# Patient Record
Sex: MALE | Race: WHITE | NOT HISPANIC OR LATINO | URBAN - METROPOLITAN AREA
[De-identification: names, ages, dates, MRNs, and addresses within clinical notes are randomized per-mention and may not be internally consistent; named-entity substitution may affect disease eponyms.]

---

## 2017-03-02 ENCOUNTER — OUTPATIENT (OUTPATIENT)
Dept: OUTPATIENT SERVICES | Facility: HOSPITAL | Age: 67
LOS: 1 days | End: 2017-03-02
Payer: MEDICARE

## 2017-03-02 DIAGNOSIS — K08.9 DISORDER OF TEETH AND SUPPORTING STRUCTURES, UNSPECIFIED: ICD-10-CM

## 2017-03-02 PROCEDURE — D2160: CPT

## 2017-03-02 PROCEDURE — D1110: CPT

## 2017-03-02 PROCEDURE — D0210: CPT

## 2017-03-02 PROCEDURE — D0120: CPT

## 2017-03-03 DIAGNOSIS — Z01.20 ENCOUNTER FOR DENTAL EXAMINATION AND CLEANING WITHOUT ABNORMAL FINDINGS: ICD-10-CM

## 2017-03-03 DIAGNOSIS — K02.9 DENTAL CARIES, UNSPECIFIED: ICD-10-CM

## 2017-05-11 ENCOUNTER — TRANSCRIPTION ENCOUNTER (OUTPATIENT)
Age: 67
End: 2017-05-11

## 2017-09-09 ENCOUNTER — TRANSCRIPTION ENCOUNTER (OUTPATIENT)
Age: 67
End: 2017-09-09

## 2017-09-24 ENCOUNTER — TRANSCRIPTION ENCOUNTER (OUTPATIENT)
Age: 67
End: 2017-09-24

## 2017-09-24 ENCOUNTER — EMERGENCY (EMERGENCY)
Facility: HOSPITAL | Age: 67
LOS: 1 days | Discharge: HOME | End: 2017-09-24
Attending: EMERGENCY MEDICINE | Admitting: EMERGENCY MEDICINE
Payer: MEDICARE

## 2017-09-24 VITALS
RESPIRATION RATE: 16 BRPM | DIASTOLIC BLOOD PRESSURE: 85 MMHG | TEMPERATURE: 97 F | HEART RATE: 79 BPM | SYSTOLIC BLOOD PRESSURE: 157 MMHG | OXYGEN SATURATION: 97 %

## 2017-09-24 VITALS
DIASTOLIC BLOOD PRESSURE: 81 MMHG | OXYGEN SATURATION: 96 % | HEART RATE: 61 BPM | RESPIRATION RATE: 18 BRPM | SYSTOLIC BLOOD PRESSURE: 131 MMHG

## 2017-09-24 LAB
APPEARANCE UR: CLEAR — SIGNIFICANT CHANGE UP
BILIRUB UR-MCNC: NEGATIVE — SIGNIFICANT CHANGE UP
COLOR SPEC: YELLOW — SIGNIFICANT CHANGE UP
DIFF PNL FLD: ABNORMAL
GLUCOSE UR QL: NEGATIVE — SIGNIFICANT CHANGE UP
KETONES UR-MCNC: NEGATIVE — SIGNIFICANT CHANGE UP
LEUKOCYTE ESTERASE UR-ACNC: ABNORMAL
NITRITE UR-MCNC: NEGATIVE — SIGNIFICANT CHANGE UP
PH UR: 6 — SIGNIFICANT CHANGE UP (ref 5–8)
PROT UR-MCNC: NEGATIVE — SIGNIFICANT CHANGE UP
RBC CASTS # UR COMP ASSIST: SIGNIFICANT CHANGE UP /HPF (ref 0–2)
SP GR SPEC: 1.01 — SIGNIFICANT CHANGE UP (ref 1.01–1.02)
UROBILINOGEN FLD QL: NEGATIVE — SIGNIFICANT CHANGE UP
WBC UR QL: SIGNIFICANT CHANGE UP /HPF (ref 0–5)

## 2017-09-24 PROCEDURE — 99284 EMERGENCY DEPT VISIT MOD MDM: CPT

## 2017-09-24 PROCEDURE — 70450 CT HEAD/BRAIN W/O DYE: CPT

## 2017-09-24 PROCEDURE — 70450 CT HEAD/BRAIN W/O DYE: CPT | Mod: 26

## 2017-09-24 PROCEDURE — 99284 EMERGENCY DEPT VISIT MOD MDM: CPT | Mod: 25

## 2017-09-24 PROCEDURE — 87086 URINE CULTURE/COLONY COUNT: CPT

## 2017-09-24 PROCEDURE — 81001 URINALYSIS AUTO W/SCOPE: CPT

## 2017-09-24 RX ORDER — CLONAZEPAM 1 MG
0 TABLET ORAL
Qty: 0 | Refills: 0 | COMMUNITY

## 2017-09-24 RX ORDER — HYDROCHLOROTHIAZIDE 25 MG
0 TABLET ORAL
Qty: 0 | Refills: 0 | COMMUNITY

## 2017-09-24 RX ORDER — LANOLIN ALCOHOL/MO/W.PET/CERES
0 CREAM (GRAM) TOPICAL
Qty: 0 | Refills: 0 | COMMUNITY

## 2017-09-24 RX ORDER — IBUPROFEN 200 MG
600 TABLET ORAL ONCE
Qty: 0 | Refills: 0 | Status: COMPLETED | OUTPATIENT
Start: 2017-09-24 | End: 2017-09-24

## 2017-09-24 RX ORDER — DEXLANSOPRAZOLE 30 MG/1
0 CAPSULE, DELAYED RELEASE ORAL
Qty: 0 | Refills: 0 | COMMUNITY

## 2017-09-24 RX ORDER — ASPIRIN/CALCIUM CARB/MAGNESIUM 324 MG
0 TABLET ORAL
Qty: 0 | Refills: 0 | COMMUNITY

## 2017-09-24 RX ORDER — TAMSULOSIN HYDROCHLORIDE 0.4 MG/1
1 CAPSULE ORAL
Qty: 0 | Refills: 0 | COMMUNITY

## 2017-09-24 RX ORDER — OXYBUTYNIN CHLORIDE 5 MG
0 TABLET ORAL
Qty: 0 | Refills: 0 | COMMUNITY

## 2017-09-24 RX ORDER — SIMVASTATIN 20 MG/1
0 TABLET, FILM COATED ORAL
Qty: 0 | Refills: 0 | COMMUNITY

## 2017-09-24 RX ADMIN — Medication 600 MILLIGRAM(S): at 15:14

## 2017-09-24 NOTE — ED ADULT NURSE NOTE - OBJECTIVE STATEMENT
65 yo m pmh of .. came to ed c/o headache since last night.  As per caregiver, pt had headache since last night, had gone to urgent care and was given promethazine and Excedrin migraine for headache, but couldn't  the prescriptions.  according to the other staff, he wasn't acting himself, current caregiver believes he has normal behavior.  Denies back pain, SOB, dizziness/lightheadedness, changes in bowel and urination habits, fevers/chills, n/v/d.  Pt at baseline mental status.  neurologically intact, PERRLA, equal strength and sensation bilaterally. 65 yo m pmh of .. came to ed c/o headache since last night.  As per caregiver, pt had headache since last night, had gone to urgent care and was given promethazine and Excedrin migraine for headache, but couldn't  the prescriptions.  according to the other staff, he wasn't acting himself, current caregiver believes he has normal behavior. Caregiver states that he has had changes in walking, needing to grab a railing for support.  Pt states that he felt dizziness with headache that resolved on its own. Denies back pain, SOB, dizziness/lightheadedness, changes in bowel and urination habits, fevers/chills, n/v/d.  Pt at baseline mental status.  neurologically intact, PERRLA, equal strength and sensation bilaterally.  safety maintained. Caregiver, Stephen, present at bedside.

## 2017-09-24 NOTE — ED PROVIDER NOTE - MEDICAL DECISION MAKING DETAILS
Headache most consistent with primary HA 1) Pain control - Motrin 2) Reassess Headache most consistent with primary HA 1) Pain control - Motrin 2) Reassess  ATTD: headache, concern for edema / hydroceph. will check ct head, check urine, pain medication, re eval for dispo

## 2017-09-24 NOTE — ED PROVIDER NOTE - PROGRESS NOTE DETAILS
ATTD: CT head with no acute path. U/A with few wbc. given no urinary symp will await culture results to consider treatment. dc home. to follow with pmd and neuro as outpt. pain resolved 0/10.

## 2017-09-24 NOTE — ED PROVIDER NOTE - OBJECTIVE STATEMENT
65yo M pmhx of migraines, on ASA81 pw ha. Aid at bedside states that pt has hx of migraines which resolve with Tylenol. Pt states this is his usual ha except that it did not resolve. Pt went to urgent care and was prescribed Excedrin migraine but could not  prescriptions so he came to ED for medication that he can take home with him. AId states pt was in pain this am but now appears at baseline. HA described by pt as frontal pressure, does not move, no photophobia, no neck pain.  No fever/chills, no change in vision, no throat pain, no chest pain, no sob no abdominal pain, no nausea/vomiting,  no dysuria, no joint pain, no rashes, no focal numbness or weakness, no latex allergy,

## 2017-09-24 NOTE — ED PROVIDER NOTE - ATTENDING CONTRIBUTION TO CARE
65 y/o pmhx of migraines, on ASA 81 presents for headache frontal that began earlier today. different than usual as this time accompanied by dizziness which resolved. no radaiton. no change in speech. no incontinence of bowel or bladder.   Aid with him at bedside. states he is at baseline and has been working with him for 4 months. only diference is dizziness not typical. no fever. no chills. no urinary complaints.  prior to coming to ER was seen at Urgent Care and sent home with rx for excedrine migraine and promethazine. no releif with APAP as usual at home today.   Gen. no acute distress, Non toxic   HEENT: EOMI, mmm,   Lungs: CTAB/L no C/ W /R   CVS: S1S2   Abd; Soft non tender, non distended   Ext: no edema   Neuro awake, alert, oriented to person, place, and season, not date, month /year which is at baseline. clear speech 65 y/o pmhx of migraines, on ASA 81 presents for headache frontal that began earlier today. different than usual as this time accompanied by dizziness which resolved. no radaiton. no change in speech. no incontinence of bowel or bladder.   Aid with him at bedside. states he is at baseline and has been working with him for 4 months. only difference is dizziness not typical. no fever. no chills. no urinary complaints.  prior to coming to ER was seen at Urgent Care and sent home with rx for excedrine migraine and promethazine. no releif with APAP as usual at home today.   Gen. no acute distress, Non toxic   HEENT: EOMI, mmm,   Lungs: CTAB/L no C/ W /R   CVS: S1S2   Abd; Soft non tender, non distended   Ext: no edema   Neuro awake, alert, oriented to person, place, and season, not date, month /year which is at baseline. clear speech

## 2017-09-24 NOTE — ED ADULT NURSE NOTE - CHPI ED SYMPTOMS NEG
no nausea/no numbness/no weakness/no confusion/no blurred vision/no change in level of consciousness/no dizziness/no fever/no vomiting/no loss of consciousness

## 2017-09-25 LAB
CULTURE RESULTS: SIGNIFICANT CHANGE UP
SPECIMEN SOURCE: SIGNIFICANT CHANGE UP

## 2017-11-09 ENCOUNTER — OUTPATIENT (OUTPATIENT)
Dept: OUTPATIENT SERVICES | Facility: HOSPITAL | Age: 67
LOS: 1 days | End: 2017-11-09
Payer: MEDICARE

## 2017-11-09 DIAGNOSIS — Z01.20 ENCOUNTER FOR DENTAL EXAMINATION AND CLEANING WITHOUT ABNORMAL FINDINGS: ICD-10-CM

## 2017-11-09 DIAGNOSIS — K08.9 DISORDER OF TEETH AND SUPPORTING STRUCTURES, UNSPECIFIED: ICD-10-CM

## 2017-11-09 PROCEDURE — D1110: CPT

## 2017-11-09 PROCEDURE — D0120: CPT

## 2017-11-09 PROCEDURE — D0270: CPT

## 2017-11-09 PROCEDURE — D0220: CPT

## 2017-11-29 ENCOUNTER — OUTPATIENT (OUTPATIENT)
Dept: OUTPATIENT SERVICES | Facility: HOSPITAL | Age: 67
LOS: 1 days | End: 2017-11-29
Payer: MEDICARE

## 2017-11-29 DIAGNOSIS — K08.9 DISORDER OF TEETH AND SUPPORTING STRUCTURES, UNSPECIFIED: ICD-10-CM

## 2017-11-29 PROCEDURE — D7210: CPT

## 2017-11-30 DIAGNOSIS — K02.9 DENTAL CARIES, UNSPECIFIED: ICD-10-CM

## 2017-12-07 ENCOUNTER — OUTPATIENT (OUTPATIENT)
Dept: OUTPATIENT SERVICES | Facility: HOSPITAL | Age: 67
LOS: 1 days | End: 2017-12-07

## 2017-12-07 DIAGNOSIS — K08.9 DISORDER OF TEETH AND SUPPORTING STRUCTURES, UNSPECIFIED: ICD-10-CM

## 2018-04-23 ENCOUNTER — TRANSCRIPTION ENCOUNTER (OUTPATIENT)
Age: 68
End: 2018-04-23

## 2018-07-31 ENCOUNTER — TRANSCRIPTION ENCOUNTER (OUTPATIENT)
Age: 68
End: 2018-07-31

## 2018-08-18 ENCOUNTER — TRANSCRIPTION ENCOUNTER (OUTPATIENT)
Age: 68
End: 2018-08-18

## 2018-10-04 ENCOUNTER — OUTPATIENT (OUTPATIENT)
Dept: OUTPATIENT SERVICES | Facility: HOSPITAL | Age: 68
LOS: 1 days | End: 2018-10-04
Payer: MEDICARE

## 2018-10-04 DIAGNOSIS — Z01.20 ENCOUNTER FOR DENTAL EXAMINATION AND CLEANING WITHOUT ABNORMAL FINDINGS: ICD-10-CM

## 2018-10-04 DIAGNOSIS — K08.9 DISORDER OF TEETH AND SUPPORTING STRUCTURES, UNSPECIFIED: ICD-10-CM

## 2018-10-04 PROCEDURE — D0120: CPT

## 2018-10-04 PROCEDURE — D1110: CPT

## 2018-10-16 ENCOUNTER — TRANSCRIPTION ENCOUNTER (OUTPATIENT)
Age: 68
End: 2018-10-16

## 2018-10-18 ENCOUNTER — TRANSCRIPTION ENCOUNTER (OUTPATIENT)
Age: 68
End: 2018-10-18

## 2019-02-05 ENCOUNTER — TRANSCRIPTION ENCOUNTER (OUTPATIENT)
Age: 69
End: 2019-02-05

## 2019-08-01 ENCOUNTER — OUTPATIENT (OUTPATIENT)
Dept: OUTPATIENT SERVICES | Facility: HOSPITAL | Age: 69
LOS: 1 days | End: 2019-08-01
Payer: MEDICARE

## 2019-08-01 DIAGNOSIS — K08.9 DISORDER OF TEETH AND SUPPORTING STRUCTURES, UNSPECIFIED: ICD-10-CM

## 2019-08-01 PROCEDURE — D0274: CPT

## 2019-08-01 PROCEDURE — D0120: CPT

## 2019-08-01 PROCEDURE — D1110: CPT

## 2019-08-08 DIAGNOSIS — Z01.20 ENCOUNTER FOR DENTAL EXAMINATION AND CLEANING WITHOUT ABNORMAL FINDINGS: ICD-10-CM

## 2020-02-05 ENCOUNTER — TRANSCRIPTION ENCOUNTER (OUTPATIENT)
Age: 70
End: 2020-02-05

## 2020-02-13 ENCOUNTER — OUTPATIENT (OUTPATIENT)
Dept: OUTPATIENT SERVICES | Facility: HOSPITAL | Age: 70
LOS: 1 days | End: 2020-02-13
Payer: MEDICARE

## 2020-02-13 PROCEDURE — D0120: CPT

## 2020-02-13 PROCEDURE — D1110: CPT

## 2020-10-01 ENCOUNTER — TRANSCRIPTION ENCOUNTER (OUTPATIENT)
Age: 70
End: 2020-10-01

## 2020-10-29 ENCOUNTER — OUTPATIENT (OUTPATIENT)
Dept: OUTPATIENT SERVICES | Facility: HOSPITAL | Age: 70
LOS: 1 days | End: 2020-10-29
Payer: MEDICARE

## 2020-10-29 DIAGNOSIS — K08.9 DISORDER OF TEETH AND SUPPORTING STRUCTURES, UNSPECIFIED: ICD-10-CM

## 2020-10-29 PROCEDURE — D9110: CPT

## 2020-10-29 PROCEDURE — D0220: CPT

## 2020-11-04 ENCOUNTER — OUTPATIENT (OUTPATIENT)
Dept: OUTPATIENT SERVICES | Facility: HOSPITAL | Age: 70
LOS: 1 days | End: 2020-11-04
Payer: MEDICARE

## 2020-11-04 DIAGNOSIS — K08.9 DISORDER OF TEETH AND SUPPORTING STRUCTURES, UNSPECIFIED: ICD-10-CM

## 2020-11-04 DIAGNOSIS — Z01.20 ENCOUNTER FOR DENTAL EXAMINATION AND CLEANING WITHOUT ABNORMAL FINDINGS: ICD-10-CM

## 2020-11-04 PROCEDURE — D7210: CPT

## 2020-11-11 DIAGNOSIS — K02.9 DENTAL CARIES, UNSPECIFIED: ICD-10-CM

## 2020-12-10 ENCOUNTER — OUTPATIENT (OUTPATIENT)
Dept: OUTPATIENT SERVICES | Facility: HOSPITAL | Age: 70
LOS: 1 days | End: 2020-12-10
Payer: MEDICARE

## 2020-12-10 DIAGNOSIS — K08.9 DISORDER OF TEETH AND SUPPORTING STRUCTURES, UNSPECIFIED: ICD-10-CM

## 2020-12-10 PROCEDURE — D0274: CPT

## 2020-12-10 PROCEDURE — D0120: CPT

## 2020-12-10 PROCEDURE — D0230: CPT

## 2020-12-10 PROCEDURE — D1110: CPT

## 2020-12-10 PROCEDURE — D0220: CPT

## 2020-12-11 ENCOUNTER — TRANSCRIPTION ENCOUNTER (OUTPATIENT)
Age: 70
End: 2020-12-11

## 2020-12-14 DIAGNOSIS — Z01.20 ENCOUNTER FOR DENTAL EXAMINATION AND CLEANING WITHOUT ABNORMAL FINDINGS: ICD-10-CM

## 2020-12-15 ENCOUNTER — OUTPATIENT (OUTPATIENT)
Dept: OUTPATIENT SERVICES | Facility: HOSPITAL | Age: 70
LOS: 1 days | Discharge: HOME | End: 2020-12-15

## 2020-12-15 ENCOUNTER — APPOINTMENT (OUTPATIENT)
Dept: SPEECH THERAPY | Facility: CLINIC | Age: 70
End: 2020-12-15

## 2020-12-16 ENCOUNTER — APPOINTMENT (OUTPATIENT)
Dept: ORTHOPEDIC SURGERY | Facility: CLINIC | Age: 70
End: 2020-12-16
Payer: MEDICARE

## 2020-12-16 VITALS
WEIGHT: 142 LBS | DIASTOLIC BLOOD PRESSURE: 75 MMHG | SYSTOLIC BLOOD PRESSURE: 145 MMHG | BODY MASS INDEX: 22.82 KG/M2 | HEART RATE: 55 BPM | HEIGHT: 66 IN | TEMPERATURE: 97.5 F

## 2020-12-16 DIAGNOSIS — Z78.9 OTHER SPECIFIED HEALTH STATUS: ICD-10-CM

## 2020-12-16 DIAGNOSIS — S63.610A UNSPECIFIED SPRAIN OF RIGHT INDEX FINGER, INITIAL ENCOUNTER: ICD-10-CM

## 2020-12-16 DIAGNOSIS — M19.041 PRIMARY OSTEOARTHRITIS, RIGHT HAND: ICD-10-CM

## 2020-12-16 DIAGNOSIS — Z87.39 PERSONAL HISTORY OF OTHER DISEASES OF THE MUSCULOSKELETAL SYSTEM AND CONNECTIVE TISSUE: ICD-10-CM

## 2020-12-16 PROCEDURE — 99203 OFFICE O/P NEW LOW 30 MIN: CPT

## 2020-12-16 RX ORDER — DEXLANSOPRAZOLE 60 MG/1
CAPSULE, DELAYED RELEASE ORAL
Refills: 0 | Status: ACTIVE | COMMUNITY

## 2020-12-16 RX ORDER — POTASSIUM 75 MG
TABLET ORAL
Refills: 0 | Status: ACTIVE | COMMUNITY

## 2020-12-16 RX ORDER — CHLORTHALIDONE 25 MG/1
25 TABLET ORAL
Refills: 0 | Status: ACTIVE | COMMUNITY

## 2020-12-16 RX ORDER — OXYBUTYNIN CHLORIDE 2.5 MG/1
TABLET ORAL
Refills: 0 | Status: ACTIVE | COMMUNITY

## 2020-12-16 NOTE — HISTORY OF PRESENT ILLNESS
[Right] : right hand dominant [FreeTextEntry1] : He comes in today for evaluation of a right hand pain, which began 8 days ago. 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 He was fitted with a splint. He has been having a lot of pain in the hand. \par \par He is accompanied by a staff member from his group home residence.

## 2020-12-16 NOTE — ADDENDUM
[FreeTextEntry1] : I, Ginger Hernandez, acted solely as a scribe for Dr. Regan on this date 12/16/2020.

## 2020-12-16 NOTE — DISCUSSION/SUMMARY
[FreeTextEntry1] : He has findings consistent with a right index finger PIP joint sprain/contusion with underlying arthritis.\par \par I had a discussion regarding today's visit, the diagnosis, and treatment recommendations / options. At this time, he will remain out of the splint. He and his aide were instructed on gentle range of motion exercises, ice and/or heat and Tylenol or anti-inflammatory agents as needed.  He will follow-up with in 3 to 4 weeks if he is not improving.\par \par The patient has agreed to this plan of management and has expressed full understanding.  All questions were fully answered to the patient's satisfaction.\par \par Over 50% of the time spent with the patient was on counseling the patient on the above diagnosis, treatment plan and prognosis.

## 2020-12-16 NOTE — END OF VISIT
[FreeTextEntry3] : This note was written by Ginger Hernandez on 12/16/2020 acting solely as a scribe for Dr. Maximiliano Regan.\par  \par All medical record entries made by the Scribe were at my, Dr. Maximiliano Regan, direction and personally dictated by me on 12/16/2020. I have personally reviewed the chart and agree that the record accurately reflects my personal performance of the history, physical exam, assessment and plan.

## 2020-12-16 NOTE — PHYSICAL EXAM
[de-identified] : - Constitutional: This is a male in no obvious distress.  He was accompanied by a staff member from his group home.\par - Psych: Patient is alert and oriented to person, place and time.  Patient has a normal mood and affect.\par - Cardiovascular: Normal pulses throughout the upper extremities.  No significant varicosities are noted in the upper extremities. \par - Neuro: Strength and sensation are intact throughout the upper extremities.  Patient has normal coordination.\par - Respiratory:  Patient exhibits no evidence of shortness of breath or difficulty breathing.\par - Skin: No rashes, lesions, or other abnormalities are noted in the upper extremities.\par \par ---\par  \par Examination of both hands demonstrates obvious arthritis.  Examination of his right index finger demonstrates swelling and tenderness along the PIP joint.  There is limitation of terminal flexion and extension.  He also has swelling and limitation of motion of the middle finger.  There is no instability of the collateral ligaments.  Grossly, he is neurovascularly intact distally. [de-identified] : I reviewed radiographs of her right index and middle fingers from 12/6/2020.  These demonstrated degenerative changes at the DIP joint of the index finger as well as the PIP joints of the index and middle fingers with associated cysts.  The radiographic findings are suggestive of an inflammatory arthropathy.  There was no evidence of a fracture.

## 2020-12-31 ENCOUNTER — APPOINTMENT (OUTPATIENT)
Age: 70
End: 2020-12-31

## 2021-01-07 ENCOUNTER — APPOINTMENT (OUTPATIENT)
Age: 71
End: 2021-01-07

## 2021-01-08 DIAGNOSIS — R47.1 DYSARTHRIA AND ANARTHRIA: ICD-10-CM

## 2021-01-14 ENCOUNTER — APPOINTMENT (OUTPATIENT)
Age: 71
End: 2021-01-14

## 2021-01-20 ENCOUNTER — APPOINTMENT (OUTPATIENT)
Age: 71
End: 2021-01-20
Payer: MEDICARE

## 2021-01-20 PROCEDURE — 92507 TX SP LANG VOICE COMM INDIV: CPT | Mod: GN

## 2021-01-28 ENCOUNTER — APPOINTMENT (OUTPATIENT)
Age: 71
End: 2021-01-28
Payer: MEDICARE

## 2021-01-28 PROCEDURE — 92507 TX SP LANG VOICE COMM INDIV: CPT

## 2021-02-04 ENCOUNTER — APPOINTMENT (OUTPATIENT)
Age: 71
End: 2021-02-04

## 2021-02-18 ENCOUNTER — APPOINTMENT (OUTPATIENT)
Dept: ORTHOPEDIC SURGERY | Facility: CLINIC | Age: 71
End: 2021-02-18
Payer: MEDICARE

## 2021-02-18 VITALS — HEIGHT: 64 IN | BODY MASS INDEX: 26.46 KG/M2 | WEIGHT: 155 LBS

## 2021-02-18 DIAGNOSIS — M17.12 UNILATERAL PRIMARY OSTEOARTHRITIS, LEFT KNEE: ICD-10-CM

## 2021-02-18 DIAGNOSIS — Z96.652 PRESENCE OF LEFT ARTIFICIAL KNEE JOINT: ICD-10-CM

## 2021-02-18 PROCEDURE — 73562 X-RAY EXAM OF KNEE 3: CPT | Mod: LT

## 2021-02-18 PROCEDURE — 99203 OFFICE O/P NEW LOW 30 MIN: CPT

## 2021-02-18 NOTE — DISCUSSION/SUMMARY
[de-identified] : I discussed the underlying pathophysiology of the patient's condition in great detail with the patient. I went over the patient's x-rays with them in great detail. We discussed the use of ice, Tylenol and anti-inflammatories to relieve pain. At this time, he will start a course of physical therapy for strengthening and flexibility. A prescription for physical therapy was provided.  I removed a staple that was left in the skin that was buried deep in the skin.  This was done under sterile preparation. I advised the patient to follow-up with his surgeon, Dr. Issac Camacho. All of his questions were answered. He understands and consents to the plan. \par \par FU with Dr. Camacho.

## 2021-02-18 NOTE — HISTORY OF PRESENT ILLNESS
[de-identified] : 70 year old male presents complaining of left knee pain that started recently. His history and recollection are limited. He is accompanied by his aide who recently started with him and is unsure of patient's history. He states that he injured his left knee and required surgery for his knee, which was done at [facility] by Dr. Issac Camacho. He has pain in the knee that restricts him from walking at times. He has noticed some swelling, as well. He lives in an assisted living facility.  He has not seen Dr. Camacho in follow-up apparently.

## 2021-02-18 NOTE — PHYSICAL EXAM
[de-identified] : Constitutional\par o Appearance : well-nourished, well developed, alert, in no acute distress \par Head and Face\par o Head :\par ¦ Inspection : atraumatic, normocephalic\par o Face :\par ¦ Inspection : no visible rash or discoloration\par Respiratory\par o Respiratory Effort: breathing unlabored \par Neurologic\par o Mental Status Examination :\par ¦ Orientation : grossly oriented to person, place and time\par Psychiatric\par o Mood and Affect: mood normal, affect appropriate \par \par Right Lower Extremity\par o Buttock : no tenderness, swelling or deformities \par o Right Hip :\par ¦ Inspection/Palpation : no tenderness, swelling or deformities\par ¦ Range of Motion : full and painless in all planes, no crepitance\par ¦ Stability : joint stability intact\par ¦ Strength : extension, flexion, adduction, abduction, internal rotation and external rotation, 4-/5 \par \par o Right Knee :\par ¦ Inspection/Palpation : no tenderness to palpation, no swelling\par ¦ Range of Motion : active flexion and extension full and painless, no crepitance\par ¦ Stability : no valgus or varus instability present on provocative testing\par ¦ Strength : flexion and extension 4-/5\par ¦ Tests and Signs : Anterior Drawer negative, Lachman negative, Didi's negative\par \par Left Lower Extremity\par o Buttock : no tenderness, swelling or deformities \par o Left Hip :\par ¦ Inspection/Palpation : no tenderness, no swelling or deformities\par ¦ Range of Motion : full and painless in all planes, no crepitance\par ¦ Stability : joint stability intact\par ¦ Strength : extension, flexion, adduction, abduction, internal rotation and external rotation, 4-/5\par \par o Left Knee :\par ¦ Inspection/Palpation : no tenderness to palpation, mild swelling, well-healed incision with a staple in the distal aspect that was removed\par ¦ Range of Motion : 0-120°, no crepitance\par ¦ Stability : no valgus or varus instability present on provocative testing\par ¦ Strength : flexion and extension 4-/5\par ¦ Tests and Signs : Anterior Drawer negative, Lachman negative, Didi's negative\par \par Gait: stiff on the left., no significant extremity swelling or lymphedema\par \par Radiology Results:\par o Left Knee: Standing AP, lateral, tunnel and skyline views were obtained and revealed a left total knee replacement which looks like it is good position.

## 2021-02-18 NOTE — ADDENDUM
[FreeTextEntry1] : I, Shaka Ambrose, acted solely as a scribe for Dr. Saud Ocasio on this date 02/18/2021.\par All medical record entries made by the Scribe were at my, Dr. Saud Ocasio, direction and personally dictated by me on 02/18/2021. I have reviewed the chart and agree that the record accurately reflects my personal performance of the history, physical exam, assessment and plan. I have also personally directed, reviewed, and agreed with the chart.

## 2021-02-18 NOTE — REASON FOR VISIT
[Initial Visit] : an initial visit for [Knee Pain] : knee pain [Formal Caregiver] : formal caregiver

## 2021-03-04 ENCOUNTER — TRANSCRIPTION ENCOUNTER (OUTPATIENT)
Age: 71
End: 2021-03-04

## 2021-07-22 ENCOUNTER — OUTPATIENT (OUTPATIENT)
Dept: OUTPATIENT SERVICES | Facility: HOSPITAL | Age: 71
LOS: 1 days | End: 2021-07-22
Payer: MEDICARE

## 2021-07-22 DIAGNOSIS — K08.9 DISORDER OF TEETH AND SUPPORTING STRUCTURES, UNSPECIFIED: ICD-10-CM

## 2021-07-22 PROCEDURE — D1110: CPT

## 2021-07-22 PROCEDURE — D0120: CPT

## 2021-07-28 DIAGNOSIS — Z01.20 ENCOUNTER FOR DENTAL EXAMINATION AND CLEANING WITHOUT ABNORMAL FINDINGS: ICD-10-CM

## 2021-07-29 ENCOUNTER — OUTPATIENT (OUTPATIENT)
Dept: OUTPATIENT SERVICES | Facility: HOSPITAL | Age: 71
LOS: 1 days | End: 2021-07-29
Payer: MEDICARE

## 2021-07-29 DIAGNOSIS — K08.9 DISORDER OF TEETH AND SUPPORTING STRUCTURES, UNSPECIFIED: ICD-10-CM

## 2021-07-29 PROCEDURE — D9110: CPT

## 2021-08-05 ENCOUNTER — OUTPATIENT (OUTPATIENT)
Dept: OUTPATIENT SERVICES | Facility: HOSPITAL | Age: 71
LOS: 1 days | End: 2021-08-05
Payer: MEDICARE

## 2021-08-05 DIAGNOSIS — K08.9 DISORDER OF TEETH AND SUPPORTING STRUCTURES, UNSPECIFIED: ICD-10-CM

## 2021-08-05 PROCEDURE — D2392: CPT

## 2021-08-12 DIAGNOSIS — K02.9 DENTAL CARIES, UNSPECIFIED: ICD-10-CM

## 2022-02-03 ENCOUNTER — OUTPATIENT (OUTPATIENT)
Dept: OUTPATIENT SERVICES | Facility: HOSPITAL | Age: 72
LOS: 1 days | End: 2022-02-03
Payer: MEDICARE

## 2022-02-03 DIAGNOSIS — K08.9 DISORDER OF TEETH AND SUPPORTING STRUCTURES, UNSPECIFIED: ICD-10-CM

## 2022-02-03 PROCEDURE — D0120: CPT

## 2022-02-03 PROCEDURE — D7210: CPT

## 2022-02-03 PROCEDURE — D0220: CPT

## 2022-02-03 PROCEDURE — D0272: CPT

## 2022-02-11 DIAGNOSIS — K02.9 DENTAL CARIES, UNSPECIFIED: ICD-10-CM

## 2022-02-11 DIAGNOSIS — Z01.20 ENCOUNTER FOR DENTAL EXAMINATION AND CLEANING WITHOUT ABNORMAL FINDINGS: ICD-10-CM

## 2022-02-17 ENCOUNTER — OUTPATIENT (OUTPATIENT)
Dept: OUTPATIENT SERVICES | Facility: HOSPITAL | Age: 72
LOS: 1 days | End: 2022-02-17
Payer: MEDICARE

## 2022-02-17 DIAGNOSIS — K08.9 DISORDER OF TEETH AND SUPPORTING STRUCTURES, UNSPECIFIED: ICD-10-CM

## 2022-02-17 PROCEDURE — D1110: CPT

## 2022-02-17 PROCEDURE — D0120: CPT

## 2022-02-24 DIAGNOSIS — Z01.20 ENCOUNTER FOR DENTAL EXAMINATION AND CLEANING WITHOUT ABNORMAL FINDINGS: ICD-10-CM

## 2022-03-24 ENCOUNTER — OUTPATIENT (OUTPATIENT)
Dept: OUTPATIENT SERVICES | Facility: HOSPITAL | Age: 72
LOS: 1 days | End: 2022-03-24
Payer: MEDICARE

## 2022-03-24 DIAGNOSIS — K08.9 DISORDER OF TEETH AND SUPPORTING STRUCTURES, UNSPECIFIED: ICD-10-CM

## 2022-03-24 PROCEDURE — D2392: CPT

## 2022-04-06 DIAGNOSIS — K02.9 DENTAL CARIES, UNSPECIFIED: ICD-10-CM

## 2022-07-31 ENCOUNTER — NON-APPOINTMENT (OUTPATIENT)
Age: 72
End: 2022-07-31

## 2022-09-01 ENCOUNTER — NON-APPOINTMENT (OUTPATIENT)
Age: 72
End: 2022-09-01

## 2022-09-08 ENCOUNTER — OUTPATIENT (OUTPATIENT)
Dept: OUTPATIENT SERVICES | Facility: HOSPITAL | Age: 72
LOS: 1 days | End: 2022-09-08
Payer: MEDICARE

## 2022-09-08 DIAGNOSIS — K08.9 DISORDER OF TEETH AND SUPPORTING STRUCTURES, UNSPECIFIED: ICD-10-CM

## 2022-09-08 PROCEDURE — D0120: CPT

## 2022-09-08 PROCEDURE — D0230: CPT

## 2022-09-08 PROCEDURE — D0274: CPT

## 2022-09-08 PROCEDURE — D0220: CPT

## 2022-09-08 PROCEDURE — D1110: CPT

## 2022-09-15 DIAGNOSIS — Z01.20 ENCOUNTER FOR DENTAL EXAMINATION AND CLEANING WITHOUT ABNORMAL FINDINGS: ICD-10-CM

## 2022-11-29 ENCOUNTER — NON-APPOINTMENT (OUTPATIENT)
Age: 72
End: 2022-11-29

## 2022-12-26 ENCOUNTER — NON-APPOINTMENT (OUTPATIENT)
Age: 72
End: 2022-12-26

## 2023-06-14 ENCOUNTER — NON-APPOINTMENT (OUTPATIENT)
Age: 73
End: 2023-06-14

## 2023-06-22 ENCOUNTER — OUTPATIENT (OUTPATIENT)
Dept: OUTPATIENT SERVICES | Facility: HOSPITAL | Age: 73
LOS: 1 days | End: 2023-06-22
Payer: MEDICARE

## 2023-06-22 DIAGNOSIS — Z01.20 ENCOUNTER FOR DENTAL EXAMINATION AND CLEANING WITHOUT ABNORMAL FINDINGS: ICD-10-CM

## 2023-06-22 DIAGNOSIS — K08.9 DISORDER OF TEETH AND SUPPORTING STRUCTURES, UNSPECIFIED: ICD-10-CM

## 2023-06-22 PROCEDURE — D1110: CPT

## 2023-06-22 PROCEDURE — D0120: CPT

## 2023-06-24 ENCOUNTER — NON-APPOINTMENT (OUTPATIENT)
Age: 73
End: 2023-06-24

## 2023-06-25 ENCOUNTER — EMERGENCY (EMERGENCY)
Facility: HOSPITAL | Age: 73
LOS: 1 days | Discharge: NURSING, REHAB OR LONG-TERM CARE | End: 2023-06-25
Attending: EMERGENCY MEDICINE
Payer: MEDICARE

## 2023-06-25 VITALS
WEIGHT: 149.03 LBS | SYSTOLIC BLOOD PRESSURE: 164 MMHG | DIASTOLIC BLOOD PRESSURE: 75 MMHG | TEMPERATURE: 98 F | OXYGEN SATURATION: 99 % | HEIGHT: 67 IN | RESPIRATION RATE: 19 BRPM | HEART RATE: 57 BPM

## 2023-06-25 PROCEDURE — 99284 EMERGENCY DEPT VISIT MOD MDM: CPT

## 2023-06-25 NOTE — ED ADULT TRIAGE NOTE - CHIEF COMPLAINT QUOTE
C/o bleeding from testicles sustained around 1730 which has since subsided. Referred from . Denies hematuria, blood thinners C/o bleeding from testicles at around 1730 which has since subsided. Referred from . Denies hematuria, blood thinners

## 2023-06-26 VITALS
HEART RATE: 55 BPM | TEMPERATURE: 98 F | SYSTOLIC BLOOD PRESSURE: 166 MMHG | OXYGEN SATURATION: 98 % | DIASTOLIC BLOOD PRESSURE: 60 MMHG | RESPIRATION RATE: 18 BRPM

## 2023-06-26 LAB
ALBUMIN SERPL ELPH-MCNC: 4.3 G/DL — SIGNIFICANT CHANGE UP (ref 3.3–5)
ALP SERPL-CCNC: 70 U/L — SIGNIFICANT CHANGE UP (ref 40–120)
ALT FLD-CCNC: 21 U/L — SIGNIFICANT CHANGE UP (ref 10–45)
ANION GAP SERPL CALC-SCNC: 13 MMOL/L — SIGNIFICANT CHANGE UP (ref 5–17)
APPEARANCE UR: CLEAR — SIGNIFICANT CHANGE UP
AST SERPL-CCNC: 26 U/L — SIGNIFICANT CHANGE UP (ref 10–40)
BACTERIA # UR AUTO: NEGATIVE — SIGNIFICANT CHANGE UP
BASOPHILS # BLD AUTO: 0.03 K/UL — SIGNIFICANT CHANGE UP (ref 0–0.2)
BASOPHILS NFR BLD AUTO: 0.5 % — SIGNIFICANT CHANGE UP (ref 0–2)
BILIRUB SERPL-MCNC: 1 MG/DL — SIGNIFICANT CHANGE UP (ref 0.2–1.2)
BILIRUB UR-MCNC: NEGATIVE — SIGNIFICANT CHANGE UP
BUN SERPL-MCNC: 20 MG/DL — SIGNIFICANT CHANGE UP (ref 7–23)
CALCIUM SERPL-MCNC: 8.6 MG/DL — SIGNIFICANT CHANGE UP (ref 8.4–10.5)
CHLORIDE SERPL-SCNC: 106 MMOL/L — SIGNIFICANT CHANGE UP (ref 96–108)
CO2 SERPL-SCNC: 24 MMOL/L — SIGNIFICANT CHANGE UP (ref 22–31)
COLOR SPEC: COLORLESS — SIGNIFICANT CHANGE UP
CREAT SERPL-MCNC: 0.88 MG/DL — SIGNIFICANT CHANGE UP (ref 0.5–1.3)
DIFF PNL FLD: NEGATIVE — SIGNIFICANT CHANGE UP
EGFR: 92 ML/MIN/1.73M2 — SIGNIFICANT CHANGE UP
EOSINOPHIL # BLD AUTO: 0.12 K/UL — SIGNIFICANT CHANGE UP (ref 0–0.5)
EOSINOPHIL NFR BLD AUTO: 2.1 % — SIGNIFICANT CHANGE UP (ref 0–6)
EPI CELLS # UR: 0 /HPF — SIGNIFICANT CHANGE UP
GLUCOSE SERPL-MCNC: 96 MG/DL — SIGNIFICANT CHANGE UP (ref 70–99)
GLUCOSE UR QL: NEGATIVE — SIGNIFICANT CHANGE UP
HCT VFR BLD CALC: 40 % — SIGNIFICANT CHANGE UP (ref 39–50)
HGB BLD-MCNC: 13.8 G/DL — SIGNIFICANT CHANGE UP (ref 13–17)
IMM GRANULOCYTES NFR BLD AUTO: 0.5 % — SIGNIFICANT CHANGE UP (ref 0–0.9)
KETONES UR-MCNC: NEGATIVE — SIGNIFICANT CHANGE UP
LEUKOCYTE ESTERASE UR-ACNC: NEGATIVE — SIGNIFICANT CHANGE UP
LYMPHOCYTES # BLD AUTO: 0.88 K/UL — LOW (ref 1–3.3)
LYMPHOCYTES # BLD AUTO: 15.2 % — SIGNIFICANT CHANGE UP (ref 13–44)
MCHC RBC-ENTMCNC: 31.1 PG — SIGNIFICANT CHANGE UP (ref 27–34)
MCHC RBC-ENTMCNC: 34.5 GM/DL — SIGNIFICANT CHANGE UP (ref 32–36)
MCV RBC AUTO: 90.1 FL — SIGNIFICANT CHANGE UP (ref 80–100)
MONOCYTES # BLD AUTO: 0.56 K/UL — SIGNIFICANT CHANGE UP (ref 0–0.9)
MONOCYTES NFR BLD AUTO: 9.7 % — SIGNIFICANT CHANGE UP (ref 2–14)
NEUTROPHILS # BLD AUTO: 4.17 K/UL — SIGNIFICANT CHANGE UP (ref 1.8–7.4)
NEUTROPHILS NFR BLD AUTO: 72 % — SIGNIFICANT CHANGE UP (ref 43–77)
NITRITE UR-MCNC: NEGATIVE — SIGNIFICANT CHANGE UP
NRBC # BLD: 0 /100 WBCS — SIGNIFICANT CHANGE UP (ref 0–0)
PH UR: 5.5 — SIGNIFICANT CHANGE UP (ref 5–8)
PLATELET # BLD AUTO: 167 K/UL — SIGNIFICANT CHANGE UP (ref 150–400)
POTASSIUM SERPL-MCNC: 3.7 MMOL/L — SIGNIFICANT CHANGE UP (ref 3.5–5.3)
POTASSIUM SERPL-SCNC: 3.7 MMOL/L — SIGNIFICANT CHANGE UP (ref 3.5–5.3)
PROT SERPL-MCNC: 6.6 G/DL — SIGNIFICANT CHANGE UP (ref 6–8.3)
PROT UR-MCNC: NEGATIVE — SIGNIFICANT CHANGE UP
RBC # BLD: 4.44 M/UL — SIGNIFICANT CHANGE UP (ref 4.2–5.8)
RBC # FLD: 12.3 % — SIGNIFICANT CHANGE UP (ref 10.3–14.5)
RBC CASTS # UR COMP ASSIST: 0 /HPF — SIGNIFICANT CHANGE UP (ref 0–4)
SODIUM SERPL-SCNC: 143 MMOL/L — SIGNIFICANT CHANGE UP (ref 135–145)
SP GR SPEC: 1.01 — LOW (ref 1.01–1.02)
UROBILINOGEN FLD QL: NEGATIVE — SIGNIFICANT CHANGE UP
WBC # BLD: 5.79 K/UL — SIGNIFICANT CHANGE UP (ref 3.8–10.5)
WBC # FLD AUTO: 5.79 K/UL — SIGNIFICANT CHANGE UP (ref 3.8–10.5)
WBC UR QL: 1 /HPF — SIGNIFICANT CHANGE UP (ref 0–5)

## 2023-06-26 PROCEDURE — 80053 COMPREHEN METABOLIC PANEL: CPT

## 2023-06-26 PROCEDURE — 99283 EMERGENCY DEPT VISIT LOW MDM: CPT

## 2023-06-26 PROCEDURE — 81001 URINALYSIS AUTO W/SCOPE: CPT

## 2023-06-26 PROCEDURE — 87086 URINE CULTURE/COLONY COUNT: CPT

## 2023-06-26 PROCEDURE — 85025 COMPLETE CBC W/AUTO DIFF WBC: CPT

## 2023-06-26 NOTE — ED ADULT NURSE NOTE - NSFALLUNIVINTERV_ED_ALL_ED
Bed/Stretcher in lowest position, wheels locked, appropriate side rails in place/Call bell, personal items and telephone in reach/Instruct patient to call for assistance before getting out of bed/chair/stretcher/Non-slip footwear applied when patient is off stretcher/[place] to call system/Physically safe environment - no spills, clutter or unnecessary equipment/Purposeful proactive rounding/Room/bathroom lighting operational, light cord in reach

## 2023-06-26 NOTE — ED ADULT NURSE NOTE - OBJECTIVE STATEMENT
Pt presents to the ED A&Ox4  from group home w aid at bedside co bleeding from testicles. Pt states he went to the bathroom when he saw blood coming from his testicles. not penis. AS per aide, bleeding had stopped at this time, but was moderate in the group home. Pt endorses testicle pain, denies NVD, bloody stools.

## 2023-06-26 NOTE — ED PROVIDER NOTE - NSFOLLOWUPINSTRUCTIONS_ED_ALL_ED_FT
See your Primary Doctor this week for follow up -- call to discuss.    Stay well hydrated, eat regular healthy meals, get plenty of rest, continue current medications.    Seek immediate medical care for new/worsening symptoms/concerns.    - abnormal bleeding    - pain / swelling     - fevers     - etc.

## 2023-06-26 NOTE — ED PROVIDER NOTE - ATTENDING CONTRIBUTION TO CARE
------------ATTENDING NOTE------------  pt brought to ED w/ aide for concerns blood on shower room floor, pt/aide both describe blood from scrotum, pt describes scratching himself, pt/aide sent to ED from Urgent Care because "nothing was found", no additional bleeding, normal skin exam, no report of heamturia or hematochezia or hematemesis or epistaxis, pt at his baseline mental/functional status, no petechiae / bruising, soft benign abd, tolerating PO, nml VS, awaiting labs and close reassessments -->  - Jose Arceo MD   ----------------------------------------------- ------------ATTENDING NOTE------------  pt brought to ED w/ aide for concerns blood on shower room floor, pt/aide both describe blood from scrotum, pt describes scratching himself, pt/aide sent to ED from Urgent Care because "nothing was found", no additional bleeding, normal skin exam, no report of heamturia or hematochezia or hematemesis or epistaxis, pt at his baseline mental/functional status, no petechiae / bruising, soft benign abd, tolerating PO, nml VS, awaiting labs and close reassessments --> labs wnl / no abnormal bleeding / nml VS, bengin repeat exams, in depth dw all about ddx, tx, jiang, continued close outpt fu.  - Jose Arceo MD   -----------------------------------------------

## 2023-06-26 NOTE — ED PROVIDER NOTE - CLINICAL SUMMARY MEDICAL DECISION MAKING FREE TEXT BOX
see MD Note ------------ATTENDING NOTE------------  pt brought to ED w/ aide for concerns blood on shower room floor, pt/aide both describe blood from scrotum, pt describes scratching himself, pt/aide sent to ED from Urgent Care because "nothing was found", no additional bleeding, normal skin exam, no report of heamturia or hematochezia or hematemesis or epistaxis, pt at his baseline mental/functional status, no petechiae / bruising, soft benign abd, tolerating PO, nml VS, awaiting labs and close reassessments --> labs wnl / no abnormal bleeding / nml VS, bengin repeat exams, in depth dw all about ddx, tx, jiang, continued close outpt fu.  - Jose Arceo MD   -----------------------------------------------

## 2023-06-26 NOTE — ED PROVIDER NOTE - PHYSICAL EXAMINATION
Well Appearing, Nontoxic, NAD;  Symm Facies, PERRL 3mm, (-)Pallor, Anicteric, MMM;  RRR w/o m/g/r, equal distal pulses;   CTAB w/o distress;   Abd soft, nt/nd, +bs;  No CVAT, nml external genitalia w/o trauma / irritation / bleeding, nml penis/glans, nml rectal w/o gross blood;  No edema/calf tender;  No rash;  AOX3, Normal speech, normal strength/sensation/gait

## 2023-06-26 NOTE — ED PROVIDER NOTE - PATIENT PORTAL LINK FT
You can access the FollowMyHealth Patient Portal offered by [facility] by registering at the following website: [email or web address]. By joining [facility]’s FollowMyHealth portal, you will also be able to view your health information using other applications (apps) compatible with our system.

## 2023-06-26 NOTE — ED PROVIDER NOTE - TOBACCO USE
Spoke with patient via phone. Per Dr. Dugan and pathology from 2/6/2020, patient could have cuff brachy or observe. This will be discussed in further detail at 2/25/2020 post-op appointment. Patient verbalized understanding and doing well post-op with pain under control.  
Never smoker

## 2023-06-26 NOTE — ED ADULT NURSE NOTE - CHIEF COMPLAINT QUOTE
C/o bleeding from testicles at around 1730 which has since subsided. Referred from . Denies hematuria, blood thinners

## 2023-06-27 LAB
CULTURE RESULTS: SIGNIFICANT CHANGE UP
SPECIMEN SOURCE: SIGNIFICANT CHANGE UP

## 2023-08-26 PROBLEM — Z78.9 OTHER SPECIFIED HEALTH STATUS: Chronic | Status: ACTIVE | Noted: 2023-06-26

## 2023-10-02 ENCOUNTER — NON-APPOINTMENT (OUTPATIENT)
Age: 73
End: 2023-10-02

## 2023-11-13 ENCOUNTER — NON-APPOINTMENT (OUTPATIENT)
Age: 73
End: 2023-11-13

## 2023-12-09 ENCOUNTER — NON-APPOINTMENT (OUTPATIENT)
Age: 73
End: 2023-12-09

## 2023-12-28 ENCOUNTER — APPOINTMENT (OUTPATIENT)
Age: 73
End: 2023-12-28

## 2023-12-28 ENCOUNTER — OUTPATIENT (OUTPATIENT)
Dept: OUTPATIENT SERVICES | Facility: HOSPITAL | Age: 73
LOS: 1 days | End: 2023-12-28
Payer: MEDICARE

## 2023-12-28 ENCOUNTER — APPOINTMENT (OUTPATIENT)
Age: 73
End: 2023-12-28
Payer: MEDICAID

## 2023-12-28 PROCEDURE — D1110: CPT

## 2023-12-28 PROCEDURE — ZZZZZ: CPT

## 2023-12-28 PROCEDURE — D0120: CPT

## 2023-12-28 PROCEDURE — D0274: CPT

## 2024-01-15 ENCOUNTER — NON-APPOINTMENT (OUTPATIENT)
Age: 74
End: 2024-01-15

## 2024-02-15 DIAGNOSIS — Z01.20 ENCOUNTER FOR DENTAL EXAMINATION AND CLEANING WITHOUT ABNORMAL FINDINGS: ICD-10-CM

## 2024-04-07 ENCOUNTER — NON-APPOINTMENT (OUTPATIENT)
Age: 74
End: 2024-04-07

## 2024-07-18 ENCOUNTER — APPOINTMENT (OUTPATIENT)
Age: 74
End: 2024-07-18
Payer: MEDICAID

## 2024-07-18 ENCOUNTER — OUTPATIENT (OUTPATIENT)
Dept: OUTPATIENT SERVICES | Facility: HOSPITAL | Age: 74
LOS: 1 days | End: 2024-07-18
Payer: MEDICARE

## 2024-07-18 PROCEDURE — D0120: CPT

## 2024-07-18 PROCEDURE — D1110: CPT

## 2024-07-18 PROCEDURE — ZZZZZ: CPT

## 2024-07-21 ENCOUNTER — NON-APPOINTMENT (OUTPATIENT)
Age: 74
End: 2024-07-21

## 2024-09-17 ENCOUNTER — NON-APPOINTMENT (OUTPATIENT)
Age: 74
End: 2024-09-17

## 2024-09-27 DIAGNOSIS — Z01.20 ENCOUNTER FOR DENTAL EXAMINATION AND CLEANING WITHOUT ABNORMAL FINDINGS: ICD-10-CM

## 2024-11-05 ENCOUNTER — NON-APPOINTMENT (OUTPATIENT)
Age: 74
End: 2024-11-05

## 2025-01-23 ENCOUNTER — APPOINTMENT (OUTPATIENT)
Age: 75
End: 2025-01-23
Payer: MEDICAID

## 2025-01-23 ENCOUNTER — OUTPATIENT (OUTPATIENT)
Dept: OUTPATIENT SERVICES | Facility: HOSPITAL | Age: 75
LOS: 1 days | End: 2025-01-23
Payer: MEDICARE

## 2025-01-23 ENCOUNTER — EMERGENCY (EMERGENCY)
Facility: HOSPITAL | Age: 75
LOS: 1 days | Discharge: HOME | End: 2025-01-23
Attending: EMERGENCY MEDICINE
Payer: MEDICARE

## 2025-01-23 VITALS — HEART RATE: 65 BPM | SYSTOLIC BLOOD PRESSURE: 169 MMHG | DIASTOLIC BLOOD PRESSURE: 70 MMHG

## 2025-01-23 VITALS
RESPIRATION RATE: 16 BRPM | HEART RATE: 63 BPM | SYSTOLIC BLOOD PRESSURE: 181 MMHG | DIASTOLIC BLOOD PRESSURE: 79 MMHG | HEIGHT: 65 IN | OXYGEN SATURATION: 96 % | TEMPERATURE: 98 F | WEIGHT: 179.9 LBS

## 2025-01-23 PROCEDURE — D1110: CPT

## 2025-01-23 PROCEDURE — D0120: CPT

## 2025-01-23 PROCEDURE — 99283 EMERGENCY DEPT VISIT LOW MDM: CPT

## 2025-01-23 PROCEDURE — ZZZZZ: CPT

## 2025-01-23 PROCEDURE — D0220: CPT

## 2025-01-23 PROCEDURE — 99282 EMERGENCY DEPT VISIT SF MDM: CPT

## 2025-01-23 PROCEDURE — D0274: CPT

## 2025-01-23 RX ORDER — ACETAMINOPHEN 80 MG/.8ML
650 SOLUTION/ DROPS ORAL ONCE
Refills: 0 | Status: COMPLETED | OUTPATIENT
Start: 2025-01-23 | End: 2025-01-23

## 2025-01-23 RX ADMIN — ACETAMINOPHEN 650 MILLIGRAM(S): 80 SOLUTION/ DROPS ORAL at 17:33

## 2025-01-23 NOTE — ED PROVIDER NOTE - PHYSICAL EXAMINATION
General: no acute distress  Psych: mood appropriate  Head: normocephalic; atraumatic  Eyes: conjunctivae clear bilaterally, sclerae anicteric  ENT: no nasal flaring, patent nares  Cardio: regular rate and rhythm; normal heart sounds  Resp: clear to auscultation bilaterally  GI: abdomen soft, nontender, nondistended  Neuro: moving all extremities, normal sensation  Skin: no rashes or bruising noted  MSK: normal movement of extremities  Lymph/Vasc: no LE edema

## 2025-01-23 NOTE — ED PROVIDER NOTE - PATIENT PORTAL LINK FT
You can access the FollowMyHealth Patient Portal offered by [facility] by registering at the following website: [email or web address]. By joining [facility]’s FollowMyHealth portal, you will also be able to view your health information using other applications (apps) compatible with our system.

## 2025-01-23 NOTE — ED ADULT NURSE NOTE - OBJECTIVE STATEMENT
74 year old male presents to ED via ems c/o episode of shaking/pain/htn during dental procedure when they were drilling in his tooth.  Patient reports he felt extreme pain but did not lose consciousness.  Patient is a resident of a group home with mild mental retardation.  In no acute distress. An attendant is at the bedside. Patient only reports tooth pain at this time. Took Motrin earlier this morning.

## 2025-01-23 NOTE — ED PROVIDER NOTE - CLINICAL SUMMARY MEDICAL DECISION MAKING FREE TEXT BOX
AMBER Feldman PGY2- patient here from dental clinic s/p brief episode of shaking while lidocaine being injected, found to be hypertensive with SBP in 180s. no other symptoms. patient well appearing, no abnormal exam findings, hypertensive initially however otherwise vitals WNL. likely somewhat related to pain, will give meds for pain, reassess, likely dc home as patient otherwise asymptomatic therefore presentation less consistent with hypertensive urgency/emergency.

## 2025-01-23 NOTE — ED ADULT NURSE NOTE - BREATHING, MLM
The x-ray show no fracture.    Most likely the toe was dislocated tonight but was reduced during the x-ray    Wear the postop shoe for the next couple of days    Tylenol and Motrin    Ice the area   Spontaneous, unlabored and symmetrical

## 2025-01-23 NOTE — ED PROVIDER NOTE - OBJECTIVE STATEMENT
The patient is a 75 y/o M with past medical history of HTN, HLD, bipolar disorder, presenting from dental clinic with hypertension. Patient with cracked upper tooth, was getting procedure done, had brief episode of shaking when lidocaine was injected, then found to be hypertensive to 180s systolic. No other symptoms- patient denies any headache, vision changes, fever, chest pain, shortness of breath, syncope, abd pain, nausea, vomiting, diarrhea, or any other symptoms. The patient is a 75 y/o M with past medical history of HTN, HLD, bipolar disorder, presenting from dental clinic with hypertension. Patient with cracked upper tooth, was getting procedure done, had brief episode of shaking when lidocaine was injected, then found to be hypertensive to 180s systolic. No other symptoms- patient denies any headache, vision changes, fever, chest pain, shortness of breath, syncope, abd pain, nausea, vomiting, diarrhea, or any other symptoms.    Attendin-year-old male presents with elevated blood pressure during a dental procedure earlier today.  Patient states that he was getting anesthesia and felt like the dentist hit a nerve when he was doing the dental block.  He had acute pain.  Blood pressure was in the 180s systolic.  no chest pain or shortness of breath.

## 2025-01-23 NOTE — ED ADULT NURSE NOTE - NSFALLRISKINTERV_ED_ALL_ED

## 2025-01-23 NOTE — ED PROVIDER NOTE - NSFOLLOWUPINSTRUCTIONS_ED_ALL_ED_FT
Please continue taking all your medications as prescribed.    Take tylenol as needed for pain.    Follow up with your dentist as scheduled to complete the procedure that they began in clinic today.      Hypertension    Hypertension, commonly called high blood pressure, is when the force of blood pumping through your arteries is too strong. Hypertension forces your heart to work harder to pump blood. Your arteries may become narrow or stiff. Having untreated or uncontrolled hypertension for a long period of time can cause heart attack, stroke, kidney disease, and other problems. If started on a medication, take exactly as prescribed by your health care professional. Maintain a healthy lifestyle and follow up with your primary care physician.    SEEK IMMEDIATE MEDICAL CARE IF YOU HAVE ANY OF THE FOLLOWING SYMPTOMS: severe headache, confusion, chest pain, abdominal pain, vomiting, or shortness of breath.

## 2025-02-06 ENCOUNTER — APPOINTMENT (OUTPATIENT)
Age: 75
End: 2025-02-06

## 2025-02-06 PROBLEM — I10 ESSENTIAL (PRIMARY) HYPERTENSION: Chronic | Status: ACTIVE | Noted: 2025-01-23

## 2025-02-26 ENCOUNTER — APPOINTMENT (OUTPATIENT)
Age: 75
End: 2025-02-26

## 2025-03-26 ENCOUNTER — TRANSCRIPTION ENCOUNTER (OUTPATIENT)
Age: 75
End: 2025-03-26

## 2025-03-31 ENCOUNTER — APPOINTMENT (OUTPATIENT)
Age: 75
End: 2025-03-31

## 2025-04-04 ENCOUNTER — APPOINTMENT (OUTPATIENT)
Age: 75
End: 2025-04-04

## 2025-04-04 ENCOUNTER — OUTPATIENT (OUTPATIENT)
Dept: OUTPATIENT SERVICES | Facility: HOSPITAL | Age: 75
LOS: 1 days | End: 2025-04-04
Payer: MEDICARE

## 2025-04-04 PROCEDURE — D7210: CPT

## 2025-04-04 PROCEDURE — ZZZZZ: CPT

## 2025-04-15 ENCOUNTER — APPOINTMENT (OUTPATIENT)
Age: 75
End: 2025-04-15

## 2025-04-15 DIAGNOSIS — Z01.20 ENCOUNTER FOR DENTAL EXAMINATION AND CLEANING WITHOUT ABNORMAL FINDINGS: ICD-10-CM

## 2025-04-15 DIAGNOSIS — K02.9 DENTAL CARIES, UNSPECIFIED: ICD-10-CM

## 2025-04-16 PROBLEM — Z78.9 OTHER SPECIFIED HEALTH STATUS: Chronic | Status: INACTIVE | Noted: 2023-06-26 | Resolved: 2025-01-23

## 2025-04-24 ENCOUNTER — APPOINTMENT (OUTPATIENT)
Age: 75
End: 2025-04-24
Payer: MEDICAID

## 2025-04-24 PROCEDURE — ZZZZZ: CPT

## 2025-05-23 ENCOUNTER — NON-APPOINTMENT (OUTPATIENT)
Age: 75
End: 2025-05-23

## 2025-05-31 ENCOUNTER — NON-APPOINTMENT (OUTPATIENT)
Age: 75
End: 2025-05-31

## 2025-06-02 ENCOUNTER — NON-APPOINTMENT (OUTPATIENT)
Age: 75
End: 2025-06-02

## 2025-06-04 ENCOUNTER — NON-APPOINTMENT (OUTPATIENT)
Age: 75
End: 2025-06-04

## 2025-07-08 ENCOUNTER — NON-APPOINTMENT (OUTPATIENT)
Age: 75
End: 2025-07-08

## 2025-07-08 ENCOUNTER — APPOINTMENT (OUTPATIENT)
Age: 75
End: 2025-07-08

## 2025-07-17 ENCOUNTER — APPOINTMENT (OUTPATIENT)
Age: 75
End: 2025-07-17
Payer: MEDICAID

## 2025-07-17 PROCEDURE — ZZZZZ: CPT
